# Patient Record
Sex: MALE | Race: WHITE | NOT HISPANIC OR LATINO
[De-identification: names, ages, dates, MRNs, and addresses within clinical notes are randomized per-mention and may not be internally consistent; named-entity substitution may affect disease eponyms.]

---

## 2022-03-03 ENCOUNTER — APPOINTMENT (OUTPATIENT)
Dept: INTERNAL MEDICINE | Facility: CLINIC | Age: 47
End: 2022-03-03
Payer: COMMERCIAL

## 2022-03-03 VITALS
OXYGEN SATURATION: 97 % | DIASTOLIC BLOOD PRESSURE: 79 MMHG | HEIGHT: 72 IN | TEMPERATURE: 98 F | HEART RATE: 75 BPM | WEIGHT: 214.5 LBS | SYSTOLIC BLOOD PRESSURE: 119 MMHG | BODY MASS INDEX: 29.05 KG/M2

## 2022-03-03 DIAGNOSIS — Z80.0 FAMILY HISTORY OF MALIGNANT NEOPLASM OF DIGESTIVE ORGANS: ICD-10-CM

## 2022-03-03 DIAGNOSIS — Z78.9 OTHER SPECIFIED HEALTH STATUS: ICD-10-CM

## 2022-03-03 DIAGNOSIS — Z87.898 PERSONAL HISTORY OF OTHER SPECIFIED CONDITIONS: ICD-10-CM

## 2022-03-03 PROCEDURE — 36415 COLL VENOUS BLD VENIPUNCTURE: CPT

## 2022-03-03 PROCEDURE — 99386 PREV VISIT NEW AGE 40-64: CPT | Mod: 25

## 2022-03-03 RX ORDER — PANTOPRAZOLE SODIUM 20 MG/1
20 TABLET, DELAYED RELEASE ORAL
Refills: 0 | Status: ACTIVE | COMMUNITY

## 2022-03-03 NOTE — PLAN
[FreeTextEntry1] : Routine blood work and urine today. Refer to ENT. Pt advised to sign up for [facility] portal to review labs and communicate any questions or concerns directly. Yearly physical and return as needed for illness, medication refills, and new or existing complaints.

## 2022-03-03 NOTE — REVIEW OF SYSTEMS
[Earache] : no earache [Hearing Loss] : no hearing loss [Nosebleeds] : no nosebleeds [Postnasal Drip] : postnasal drip [Nasal Discharge] : nasal discharge [Sore Throat] : no sore throat [Hoarseness] : no hoarseness [Shortness Of Breath] : no shortness of breath [Wheezing] : wheezing [Cough] : no cough [Dyspnea on Exertion] : not dyspnea on exertion [Negative] : Heme/Lymph [FreeTextEntry4] : congestion

## 2022-03-03 NOTE — HISTORY OF PRESENT ILLNESS
[de-identified] : 46 year M with PMH GERD presents for initial CPE. Pt denies CP/SOB, fever/chills, n/v/d/c.

## 2022-03-03 NOTE — HEALTH RISK ASSESSMENT
[Good] : ~his/her~  mood as  good [Never] : Never [No] : In the past 12 months have you used drugs other than those required for medical reasons? No [0] : 2) Feeling down, depressed, or hopeless: Not at all (0) [PHQ-2 Negative - No further assessment needed] : PHQ-2 Negative - No further assessment needed [None] : None [Audit-CScore] : 0 [[id number]] : 0 [Hepatitis C test offered] : Hepatitis C test offered [With Family] : lives with family [Employed] : employed [] :  [# Of Children ___] : has [unfilled] children

## 2022-03-04 LAB
ALBUMIN SERPL ELPH-MCNC: 4.5 G/DL
ALP BLD-CCNC: 67 U/L
ALT SERPL-CCNC: 15 U/L
ANION GAP SERPL CALC-SCNC: 14 MMOL/L
APPEARANCE: CLEAR
AST SERPL-CCNC: 17 U/L
BACTERIA: NEGATIVE
BASOPHILS # BLD AUTO: 0.06 K/UL
BASOPHILS NFR BLD AUTO: 1.2 %
BILIRUB SERPL-MCNC: 0.6 MG/DL
BILIRUBIN URINE: NEGATIVE
BLOOD URINE: NEGATIVE
BUN SERPL-MCNC: 16 MG/DL
CALCIUM SERPL-MCNC: 9.7 MG/DL
CHLORIDE SERPL-SCNC: 106 MMOL/L
CHOLEST SERPL-MCNC: 161 MG/DL
CO2 SERPL-SCNC: 24 MMOL/L
COLOR: YELLOW
CREAT SERPL-MCNC: 0.99 MG/DL
EGFR: 95 ML/MIN/1.73M2
EOSINOPHIL # BLD AUTO: 0.34 K/UL
EOSINOPHIL NFR BLD AUTO: 7 %
ESTIMATED AVERAGE GLUCOSE: 108 MG/DL
GLUCOSE QUALITATIVE U: NEGATIVE
GLUCOSE SERPL-MCNC: 99 MG/DL
HBA1C MFR BLD HPLC: 5.4 %
HCT VFR BLD CALC: 48.6 %
HDLC SERPL-MCNC: 60 MG/DL
HGB BLD-MCNC: 15.9 G/DL
HYALINE CASTS: 3 /LPF
IMM GRANULOCYTES NFR BLD AUTO: 0.2 %
KETONES URINE: NEGATIVE
LDLC SERPL CALC-MCNC: 83 MG/DL
LEUKOCYTE ESTERASE URINE: NEGATIVE
LYMPHOCYTES # BLD AUTO: 1.55 K/UL
LYMPHOCYTES NFR BLD AUTO: 32 %
MAN DIFF?: NORMAL
MCHC RBC-ENTMCNC: 30.7 PG
MCHC RBC-ENTMCNC: 32.7 GM/DL
MCV RBC AUTO: 93.8 FL
MICROSCOPIC-UA: NORMAL
MONOCYTES # BLD AUTO: 0.51 K/UL
MONOCYTES NFR BLD AUTO: 10.5 %
NEUTROPHILS # BLD AUTO: 2.37 K/UL
NEUTROPHILS NFR BLD AUTO: 49.1 %
NITRITE URINE: NEGATIVE
NONHDLC SERPL-MCNC: 101 MG/DL
PH URINE: 6
PLATELET # BLD AUTO: 220 K/UL
POTASSIUM SERPL-SCNC: 4.2 MMOL/L
PROT SERPL-MCNC: 6.8 G/DL
PROTEIN URINE: NORMAL
PSA SERPL-MCNC: 0.77 NG/ML
RBC # BLD: 5.18 M/UL
RBC # FLD: 12.2 %
RED BLOOD CELLS URINE: 0 /HPF
SODIUM SERPL-SCNC: 143 MMOL/L
SPECIFIC GRAVITY URINE: 1.02
SQUAMOUS EPITHELIAL CELLS: 1 /HPF
TRIGL SERPL-MCNC: 89 MG/DL
TSH SERPL-ACNC: 1.66 UIU/ML
UROBILINOGEN URINE: NORMAL
WBC # FLD AUTO: 4.84 K/UL
WHITE BLOOD CELLS URINE: 0 /HPF

## 2022-03-07 LAB
HCV AB SER QL: NONREACTIVE
HCV S/CO RATIO: 0.11 S/CO

## 2022-09-22 ENCOUNTER — NON-APPOINTMENT (OUTPATIENT)
Age: 47
End: 2022-09-22

## 2022-09-22 ENCOUNTER — APPOINTMENT (OUTPATIENT)
Dept: CARDIOLOGY | Facility: CLINIC | Age: 47
End: 2022-09-22

## 2022-09-22 VITALS
SYSTOLIC BLOOD PRESSURE: 113 MMHG | BODY MASS INDEX: 30.07 KG/M2 | TEMPERATURE: 97.9 F | OXYGEN SATURATION: 97 % | HEIGHT: 72 IN | HEART RATE: 81 BPM | DIASTOLIC BLOOD PRESSURE: 78 MMHG | WEIGHT: 222 LBS

## 2022-09-22 PROCEDURE — 99204 OFFICE O/P NEW MOD 45 MIN: CPT | Mod: 25

## 2022-09-22 PROCEDURE — 93000 ELECTROCARDIOGRAM COMPLETE: CPT

## 2022-09-29 ENCOUNTER — APPOINTMENT (OUTPATIENT)
Dept: CARDIOLOGY | Facility: CLINIC | Age: 47
End: 2022-09-29

## 2022-09-29 PROCEDURE — 93306 TTE W/DOPPLER COMPLETE: CPT

## 2022-10-20 NOTE — HISTORY OF PRESENT ILLNESS
[FreeTextEntry1] : 47M presents to establish care\par Sent in by PMD: Dr Hi Cohn \par \par pt endorsing CP, mostly at night, when he lays flat. states it hasn’t occurred in about a week. not on exertion. pt states he does get a little sob on exertion, states it is out of proportion to the amount of exertion he is doing. pt endorses some chest tightness. can occur around once a week. no assoc diaphoresis, dizziness, palpitations, syncope. \par pt attributing symptoms to increase stress.\par pt states if he experiences the symptoms he tries to relax and breath and then the symptoms resolve. \par Pt denies LE edema, but does endorse some orthopnea. pt states his wife told him that he snores. \par \par \par Prev cardiac history: none\par \par Exercise: lifting weights, walking\par Diet: none\par \par Previous cardiac testing:\par Recent labs: 3/22: hgb 15.9 tsh 1.66 a1c 5.4 Cr .99 gfr 95 tot chol 161 tg 89 hdl 60 ldl 83\par \par \par EKG: SR, non specific st changes v2\par \par \par Med hx: GERD\par Sx hx: none\par Family hx: no known cardiac hx\par Social hx:  lives in [place] with wife and 2 kids.  owns a [employer] company.  no tob/etoh/drugs\par Meds: protonix\par Allergies: nkda\par

## 2022-10-20 NOTE — REVIEW OF SYSTEMS
[Feeling Fatigued] : feeling fatigued [Chest Discomfort] : chest discomfort [Wheezing] : wheezing [Fever] : no fever [Headache] : no headache [Weight Gain (___ Lbs)] : no recent weight gain [Chills] : no chills [Weight Loss (___ Lbs)] : no recent weight loss [Blurry Vision] : no blurred vision [Sore Throat] : no sore throat [SOB] : no shortness of breath [Dyspnea on exertion] : not dyspnea during exertion [Lower Ext Edema] : no extremity edema [Palpitations] : no palpitations [Orthopnea] : no orthopnea [Syncope] : no syncope [Cough] : no cough [Nausea] : no nausea [Vomiting] : no vomiting [Dizziness] : no dizziness [Confusion] : no confusion was observed [Easy Bleeding] : no tendency for easy bleeding [Easy Bruising] : no tendency for easy bruising

## 2022-10-20 NOTE — DISCUSSION/SUMMARY
[EKG obtained to assist in diagnosis and management of assessed problem(s)] : EKG obtained to assist in diagnosis and management of assessed problem(s) [FreeTextEntry1] : 47M presents to establish CV care\par \par 1. CP\par -unclear etiology\par -pt without many risk factors for CAD, unclear if symptoms worse with exertion\par -baseline ekg with some non specific st changes anteriorly\par -will check calcium score for further eval\par -pt does endorse a positional component to symptoms, will check tte to eval for structural heart dz, pericardial disease\par \par f/u after testing\par \par >45 spent on complete encounter\par \par \par ******ADDENDUM******\par 10/20/22\par pt planning on endoscopy colonoscopy\par pt able to ambulate without cp/sob\par recent calcium score of zero\par recent echo with no severe valvular lesions, as well as preserved lv systolic function\par no hx of syncope\par pt is low risk for low risk endoscopy and is optimized from a CV perspective and may proceed without further cardiac testing\par f/u after endoscopy

## 2022-10-31 ENCOUNTER — APPOINTMENT (OUTPATIENT)
Dept: OTOLARYNGOLOGY | Facility: CLINIC | Age: 47
End: 2022-10-31

## 2022-10-31 VITALS
DIASTOLIC BLOOD PRESSURE: 81 MMHG | WEIGHT: 222 LBS | HEIGHT: 73 IN | HEART RATE: 73 BPM | BODY MASS INDEX: 29.42 KG/M2 | SYSTOLIC BLOOD PRESSURE: 121 MMHG

## 2022-10-31 DIAGNOSIS — Z83.3 FAMILY HISTORY OF DIABETES MELLITUS: ICD-10-CM

## 2022-10-31 DIAGNOSIS — J98.9 RESPIRATORY DISORDER, UNSPECIFIED: ICD-10-CM

## 2022-10-31 PROCEDURE — 31231 NASAL ENDOSCOPY DX: CPT

## 2022-10-31 PROCEDURE — 99204 OFFICE O/P NEW MOD 45 MIN: CPT | Mod: 25

## 2022-10-31 NOTE — ASSESSMENT
[FreeTextEntry1] : Juventino Olvera presents for evaluation of chronic sinonasal issues. He notes history of chronic sinusitis symptoms of nasal congestion, postnasal drainage, and hyposmia. He had recent acute exacerbation and was treated with augmentin and flonase. He continues to be symptomatic. He has history of chronic rhinitis but has not been allergy tested. He notes no issues with NSAIDs. Sinonasal endoscopy shows diffuse bilateral polyposis. Will start medical therapy below and obtain CT sinus as patient has already been on antibiotics and topical nasal regimen. Will also refer to allergy.\par \par - Doxycycline x 7 days. Side effects of doxycycline use were discussed and include, but are not limited to diarrhea, rash, skin photosensitivity, skin hyperpigmentation, esophagitis, gastrointestinal issues, tooth discoloration, and hepatotoxicity.\par - Medrol dose pack. The potential side effects of high-dose steroid use were discussed at length. These risks include but are not limited to: increased appetite, insomnia, fluid retention, mood swings, weight gain, change in blood pressure, high blood glucose, possible adrenal suppression, osteoporosis, avascular necrosis of the hip, menstrual irregularities (if applicable), and cataracts\par - Budesonide rinses BID\par - CT sinus\par - Allergy referral\par - Follow up after CT\par

## 2022-10-31 NOTE — REVIEW OF SYSTEMS
[Sneezing] : sneezing [Post Nasal Drip] : post nasal drip [Ear Pain] : ear pain [Ear Itch] : ear itch [Dizziness] : dizziness [Vertigo] : vertigo [Lightheadedness] : lightheadedness [Nasal Congestion] : nasal congestion [Nose Bleeds] : nose bleeds [Recurrent Sinus Infections] : recurrent sinus infections [Problem Snoring] : problem snoring [Sinus Pain] : sinus pain [Sinus Pressure] : sinus pressure [Sense Of Smell Problem] : sense of smell problem [Discolored Nasal Discharge] : discolored nasal discharge [Hoarseness] : hoarseness [Throat Clearing] : throat clearing [Throat Pain] : throat pain [Throat Dryness] : throat dryness [Throat Itching] : throat itching [Swelling Neck] : swelling neck [Swelling Face] : face swelling [Eye Pain] : eye pain [Eyes Itch] : itching of the eyes [Chest Pain] : chest pain [Shortness Of Breath] : shortness of breath [Cough] : cough [Heartburn] : heartburn [Negative] : Heme/Lymph [FreeTextEntry3] : watery [FreeTextEntry6] : noisy breathing [FreeTextEntry7] : difficulty  swallowing, reflux [FreeTextEntry9] : joint aches, muscle aches [de-identified] : headache [FreeTextEntry1] : chills, fatigue, daytime sleepiness, feel cooler than others

## 2022-10-31 NOTE — PHYSICAL EXAM
[Nasal Endoscopy Performed] : nasal endoscopy was performed, see procedure section for findings [Midline] : trachea located in midline position [Normal] : no rashes [de-identified] : Polyps in bilateral nasal cavities.

## 2022-10-31 NOTE — CONSULT LETTER
[Dear  ___] : Dear  [unfilled], [Consult Letter:] : I had the pleasure of evaluating your patient, [unfilled]. [Please see my note below.] : Please see my note below. [Consult Closing:] : Thank you very much for allowing me to participate in the care of this patient.  If you have any questions, please do not hesitate to contact me. [Sincerely,] : Sincerely, [FreeTextEntry3] : Richard Barth M.D.

## 2022-10-31 NOTE — HISTORY OF PRESENT ILLNESS
[de-identified] : Sam Olvera is a 46 yo male who was referred by Dr. Cohn for evaluation of sinonasal issues. He states at the beginning of Oct 2022 he developed an ear infection along with nasal congestion, rhinorrhea, postnasal drainage, cough, sinus pressure. He was prescribed augmentin and was using flonase. He notes improvement in otologic symptoms but his sinusitis symptoms have persisted. He denies fevers, chills, vision changes, or pain/restriction of extraocular movements. He denies history of recurrent sinus infections. He is unsure of history of allergies and has not been tested.   He states that there is a ball in the right nasal cavity concerning for polyp. He notes chronic symptoms of nasal congestion and postnasal drainage. He states that he has chronic hyposmia.

## 2022-11-11 ENCOUNTER — APPOINTMENT (OUTPATIENT)
Dept: OTOLARYNGOLOGY | Facility: CLINIC | Age: 47
End: 2022-11-11

## 2022-11-11 VITALS
HEIGHT: 73 IN | DIASTOLIC BLOOD PRESSURE: 80 MMHG | WEIGHT: 220 LBS | BODY MASS INDEX: 29.16 KG/M2 | HEART RATE: 76 BPM | SYSTOLIC BLOOD PRESSURE: 114 MMHG

## 2022-11-11 DIAGNOSIS — J34.2 DEVIATED NASAL SEPTUM: ICD-10-CM

## 2022-11-11 PROCEDURE — 99214 OFFICE O/P EST MOD 30 MIN: CPT

## 2022-11-11 NOTE — ASSESSMENT
[FreeTextEntry1] : Sam Olvera presents for follow-up of chronic sinusitis with nasal polyposis. He has completed medical therapy with antibiotics, oral steroids, and budesonide rinses. He saw allergy for his chronic rhinitis and notes positive allergy testing. On sinonasal endoscopy, his polyps are present bilaterally but have decreased in size. He has septal deviation bilaterally but primarily to the left. CT sinus shows wavy septal deviation and bilateral pansinus disease with bilateral polyps. Given these findings, we discussed that he is a candidate for septoplasty, outfracture of bilateral inferior turbinates, and bilateral endoscopic sinus surgeyr. We will resect his middle turbinates as he has bilateral ron bullosa.\par \par Risk of septoplasty were discussed with the patient including but not limited to bleeding, infection, septal perforation, numbness of the front two teeth, and loss of sense of smell. Unique concerns related to septal perforation were discussed including mucus crusting, pain, whistling with breathing, and bleeding. Challenges of management of septal perforation were reiterated. The patient understands these risks and wishes to proceed as discussed.\par \par The sinus surgery itself was discussed at length. Potential benefits of the procedure were discussed, though no guarantee was made nor implied. Alternatives of doing nothing versus ongoing medical therapy alone were also discussed. The risks of the procedure were individually discussed in detail as including, but not limited to bleeding, infection, loss of sense of smell, double vision, blindness, CSF leak requiring other procedures to repair, numbness of teeth/and or face, need for revision surgery, brain/skull base injury, anesthetic risks and unexpected risks. The patient understands these risks and will proceed with surgery as they wish. All questions were answered.\par \par We will schedule surgery at his earliest convenience.\par Continue budesonide rinses BID.\par Will need preop steroids leading up to surgery.\par Follow up 1 week postop.

## 2022-11-11 NOTE — PHYSICAL EXAM
[Nasal Endoscopy Performed] : nasal endoscopy was performed, see procedure section for findings [] : septum deviated to the left [Midline] : trachea located in midline position [Normal] : no rashes [de-identified] : Polyps in bilateral nasal cavities.

## 2022-11-11 NOTE — HISTORY OF PRESENT ILLNESS
[de-identified] : Sam Olvera is a 48 yo male who was referred by Dr. Cohn for evaluation of sinonasal issues. He states at the beginning of Oct 2022 he developed an ear infection along with nasal congestion, rhinorrhea, postnasal drainage, cough, sinus pressure. He was prescribed augmentin and was using flonase. He notes improvement in otologic symptoms but his sinusitis symptoms have persisted. He denies fevers, chills, vision changes, or pain/restriction of extraocular movements. He denies history of recurrent sinus infections. He is unsure of history of allergies and has not been tested.   He states that there is a ball in the right nasal cavity concerning for polyp. He notes chronic symptoms of nasal congestion and postnasal drainage. He states that he has chronic hyposmia. [FreeTextEntry1] : 11/11/22 - Sam Olvera presents for follow-up. He completed antibiotics, oral steroids, and steroid rinses. He notes improvement in nasal congestion. He denies sinus pressure, rhinorrhea, or postnasal drainage. HE denies fevers, chills, or vision changes. he saw the allergist and per the patient's report, allergy testing was positive for dust mites, mold, and weeds.

## 2022-11-11 NOTE — DATA REVIEWED
[de-identified] : CT sinus 11/4/22:\par FINDINGS:\par \par MAXILLARY SINUSES: There is mucosal thickening maxillary sinuses bilaterally.\par There is opacification infundibular channels and middle meatus bilaterally.\par There are partially opacified ron bullosa bilaterally. There is retention\par cyst in left maxillary sinus.\par \par FRONTAL SINUSES: There is mucosal thickening in right frontal sinus and\par opacification right frontal recess. There is retention cyst in left frontal\par sinus.\par \par ETHMOID COMPLEX: There is mucosal disease in anterior posterior ethmoid\par complexes bilaterally.\par \par SPHENOID SINUSES:There is mucosal thickening in sphenoid sinuses with\par opacification of sphenoid ostia bilaterally.\par \par OTHER FINDINGS: There is wavy nasal septal deviation. The septum contacts the\par left inferior turbinate. There are bilateral nasal polyps. The sino-cranial and\par sino-orbital junctions are intact. The visualized portions of the intracranial\par and intraorbital compartments demonstrate no abnormality. The mastoid air cells\par are clear bilaterally.\par \par IMPRESSION:\par \par \par Bilateral nasal polyps and inflammatory mucosal change throughout the paranasal\par sinuses.

## 2022-11-28 ENCOUNTER — NON-APPOINTMENT (OUTPATIENT)
Age: 47
End: 2022-11-28

## 2022-11-28 ENCOUNTER — APPOINTMENT (OUTPATIENT)
Dept: CARDIOLOGY | Facility: CLINIC | Age: 47
End: 2022-11-28

## 2022-11-28 VITALS
HEART RATE: 68 BPM | SYSTOLIC BLOOD PRESSURE: 126 MMHG | TEMPERATURE: 97.9 F | WEIGHT: 220 LBS | DIASTOLIC BLOOD PRESSURE: 80 MMHG | OXYGEN SATURATION: 98 % | BODY MASS INDEX: 29.16 KG/M2 | HEIGHT: 73 IN

## 2022-11-28 DIAGNOSIS — R07.89 OTHER CHEST PAIN: ICD-10-CM

## 2022-11-28 DIAGNOSIS — R94.31 ABNORMAL ELECTROCARDIOGRAM [ECG] [EKG]: ICD-10-CM

## 2022-11-28 PROCEDURE — 99215 OFFICE O/P EST HI 40 MIN: CPT | Mod: 25

## 2022-11-28 PROCEDURE — 93000 ELECTROCARDIOGRAM COMPLETE: CPT

## 2022-11-28 NOTE — REVIEW OF SYSTEMS
[Fever] : no fever [Headache] : no headache [Weight Gain (___ Lbs)] : no recent weight gain [Chills] : no chills [Feeling Fatigued] : not feeling fatigued [Weight Loss (___ Lbs)] : no recent weight loss [Blurry Vision] : no blurred vision [Sore Throat] : no sore throat [SOB] : no shortness of breath [Dyspnea on exertion] : not dyspnea during exertion [Chest Discomfort] : chest discomfort [Lower Ext Edema] : no extremity edema [Palpitations] : no palpitations [Orthopnea] : no orthopnea [Syncope] : no syncope [Cough] : no cough [Wheezing] : no wheezing [Nausea] : no nausea [Vomiting] : no vomiting [Dizziness] : no dizziness [Confusion] : no confusion was observed [Easy Bleeding] : no tendency for easy bleeding [Easy Bruising] : no tendency for easy bruising

## 2022-11-28 NOTE — HISTORY OF PRESENT ILLNESS
[FreeTextEntry1] : 47M presents for f/u.\par PMD: Dr Hi Cohn \par \par \par Previously,\par pt last seen 9/2022 with CP for pre-operative eval for a colonoscopy. symptoms were atypical, not assoc with exertion. pt attributed symptoms to stress. pt sent for calcium score which was zero. tte grossly unremarkable.\par \par pt now presents for follow up.\par today,\par \par pt endorsing CP, pt was a walk-in. \par \par Pt states symptoms are worse at night when he lays flat. symptoms not assoc with exertion. pt on protonix, several times a week/PRN. \par denies sob, diaphoresis, syncope. \par \par \par \par Prev cardiac history: none\par \par Exercise: lifting weights, walking\par Diet: none\par \par Previous cardiac testing:\par Recent labs: 3/22: hgb 15.9 tsh 1.66 a1c 5.4 Cr .99 gfr 95 tot chol 161 tg 89 hdl 60 ldl 83\par \par \par EKG: SR, non specific st changes v2 (unchanged from prev)\par \par \par Med hx: GERD\par Sx hx: none\par Family hx: no known cardiac hx\par Social hx: lives in [place] with wife and 2 kids. 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

## 2022-11-28 NOTE — DISCUSSION/SUMMARY
[FreeTextEntry1] : 47M presents for f/u\par \par 1. CP\par -unclear etiology\par -pt without many risk factors for CAD, unclear if symptoms worse with exertion\par -baseline ekg with some non specific st changes anteriorly, unchanged from prev\par -calcium score zero\par -tte grossly unremarkable\par -suspect GERD, pt on protonix prn, recommend to take daily\par -will check trop, ck now\par \par \par >40 spent on complete encounter\par  [EKG obtained to assist in diagnosis and management of assessed problem(s)] : EKG obtained to assist in diagnosis and management of assessed problem(s)

## 2022-11-29 LAB
CK MB BLD-MCNC: 2.6 NG/ML
CK SERPL-CCNC: 114 U/L

## 2022-11-30 LAB — TROPONIN-T, HIGH SENSITIVITY: 17 NG/L

## 2023-01-09 ENCOUNTER — APPOINTMENT (OUTPATIENT)
Dept: OTOLARYNGOLOGY | Facility: HOSPITAL | Age: 48
End: 2023-01-09

## 2023-01-17 ENCOUNTER — APPOINTMENT (OUTPATIENT)
Dept: OTOLARYNGOLOGY | Facility: CLINIC | Age: 48
End: 2023-01-17

## 2023-02-02 ENCOUNTER — OUTPATIENT (OUTPATIENT)
Dept: OUTPATIENT SERVICES | Facility: HOSPITAL | Age: 48
LOS: 1 days | End: 2023-02-02
Payer: COMMERCIAL

## 2023-02-02 VITALS
DIASTOLIC BLOOD PRESSURE: 84 MMHG | TEMPERATURE: 98 F | RESPIRATION RATE: 20 BRPM | HEIGHT: 72 IN | OXYGEN SATURATION: 96 % | HEART RATE: 70 BPM | WEIGHT: 220.9 LBS | SYSTOLIC BLOOD PRESSURE: 121 MMHG

## 2023-02-02 DIAGNOSIS — J34.9 UNSPECIFIED DISORDER OF NOSE AND NASAL SINUSES: ICD-10-CM

## 2023-02-02 DIAGNOSIS — J34.2 DEVIATED NASAL SEPTUM: ICD-10-CM

## 2023-02-02 DIAGNOSIS — Z01.818 ENCOUNTER FOR OTHER PREPROCEDURAL EXAMINATION: ICD-10-CM

## 2023-02-02 DIAGNOSIS — Z98.890 OTHER SPECIFIED POSTPROCEDURAL STATES: Chronic | ICD-10-CM

## 2023-02-02 DIAGNOSIS — J32.9 CHRONIC SINUSITIS, UNSPECIFIED: ICD-10-CM

## 2023-02-02 LAB
ANION GAP SERPL CALC-SCNC: 1 MMOL/L — LOW (ref 5–17)
BUN SERPL-MCNC: 16 MG/DL — SIGNIFICANT CHANGE UP (ref 7–23)
CALCIUM SERPL-MCNC: 8.8 MG/DL — SIGNIFICANT CHANGE UP (ref 8.5–10.1)
CHLORIDE SERPL-SCNC: 107 MMOL/L — SIGNIFICANT CHANGE UP (ref 96–108)
CO2 SERPL-SCNC: 32 MMOL/L — HIGH (ref 22–31)
CREAT SERPL-MCNC: 1 MG/DL — SIGNIFICANT CHANGE UP (ref 0.5–1.3)
EGFR: 93 ML/MIN/1.73M2 — SIGNIFICANT CHANGE UP
GLUCOSE SERPL-MCNC: 70 MG/DL — SIGNIFICANT CHANGE UP (ref 70–99)
HCT VFR BLD CALC: 44.3 % — SIGNIFICANT CHANGE UP (ref 39–50)
HGB BLD-MCNC: 14.8 G/DL — SIGNIFICANT CHANGE UP (ref 13–17)
MCHC RBC-ENTMCNC: 30.7 PG — SIGNIFICANT CHANGE UP (ref 27–34)
MCHC RBC-ENTMCNC: 33.4 GM/DL — SIGNIFICANT CHANGE UP (ref 32–36)
MCV RBC AUTO: 91.9 FL — SIGNIFICANT CHANGE UP (ref 80–100)
NRBC # BLD: 0 /100 WBCS — SIGNIFICANT CHANGE UP (ref 0–0)
PLATELET # BLD AUTO: 194 K/UL — SIGNIFICANT CHANGE UP (ref 150–400)
POTASSIUM SERPL-MCNC: 4 MMOL/L — SIGNIFICANT CHANGE UP (ref 3.5–5.3)
POTASSIUM SERPL-SCNC: 4 MMOL/L — SIGNIFICANT CHANGE UP (ref 3.5–5.3)
RBC # BLD: 4.82 M/UL — SIGNIFICANT CHANGE UP (ref 4.2–5.8)
RBC # FLD: 11.9 % — SIGNIFICANT CHANGE UP (ref 10.3–14.5)
SODIUM SERPL-SCNC: 140 MMOL/L — SIGNIFICANT CHANGE UP (ref 135–145)
WBC # BLD: 4.03 K/UL — SIGNIFICANT CHANGE UP (ref 3.8–10.5)
WBC # FLD AUTO: 4.03 K/UL — SIGNIFICANT CHANGE UP (ref 3.8–10.5)

## 2023-02-02 PROCEDURE — 80048 BASIC METABOLIC PNL TOTAL CA: CPT

## 2023-02-02 PROCEDURE — 36415 COLL VENOUS BLD VENIPUNCTURE: CPT

## 2023-02-02 PROCEDURE — 85027 COMPLETE CBC AUTOMATED: CPT

## 2023-02-02 PROCEDURE — G0463: CPT

## 2023-02-02 NOTE — H&P PST ADULT - NSICDXPASTSURGICALHX_GEN_ALL_CORE_FT
PAST SURGICAL HISTORY:  History of cardiac cath     History of colonoscopy     History of endoscopy

## 2023-02-02 NOTE — H&P PST ADULT - PROBLEM SELECTOR PLAN 1
Scheduled for endoscopic sinus surgery - bilateral sphenoidectomy- frontal sinus dissection- Maxillary antrostomy with removal contents- septoplasty- outfracture nasal turbinates  preop instruction provided, verbalized understanding and teach back

## 2023-02-02 NOTE — H&P PST ADULT - NSICDXPROCEDURE_GEN_ALL_CORE_FT
PROCEDURES:  Endoscopic sinus surgery 02-Feb-2023 11:36:23 chronic sinusitis/ deviated nasal septum [place]

## 2023-02-02 NOTE — H&P PST ADULT - HISTORY OF PRESENT ILLNESS
47 year old male presents for preop testing for scheduled endoscopic sinus surgery/ bilateral sphenoidectomy/ frontal sinus dissection/ maxillary antrostomy with removal contents- septoplasty- outfracture nasal turbinates on 2/13/2023. His medical history significant for GERD, mild controlled asthma, deviated septum, states had chest pain when his brother passed away many years ago (most recent ekg and echo report 11/2022 on file).  Pt denies any symptoms of covid-19 infection, scheduled for PCR test as required prior to surgery

## 2023-02-02 NOTE — H&P PST ADULT - NSICDXFAMILYHX_GEN_ALL_CORE_FT
FAMILY HISTORY:  Sibling  Still living? No  Family history of esophageal cancer, Age at diagnosis: Age Unknown  FHx: pancreatic cancer, Age at diagnosis: Age Unknown

## 2023-02-02 NOTE — H&P PST ADULT - NSICDXPASTMEDICALHX_GEN_ALL_CORE_FT
PAST MEDICAL HISTORY:  Asthma     Atypical chest pain     Deviated nasal septum     GERD (gastroesophageal reflux disease)

## 2023-02-08 PROBLEM — R07.89 OTHER CHEST PAIN: Chronic | Status: ACTIVE | Noted: 2023-02-02

## 2023-02-08 PROBLEM — K21.9 GASTRO-ESOPHAGEAL REFLUX DISEASE WITHOUT ESOPHAGITIS: Chronic | Status: ACTIVE | Noted: 2023-02-02

## 2023-02-08 PROBLEM — J34.2 DEVIATED NASAL SEPTUM: Chronic | Status: ACTIVE | Noted: 2023-02-02

## 2023-02-08 PROBLEM — J45.909 UNSPECIFIED ASTHMA, UNCOMPLICATED: Chronic | Status: ACTIVE | Noted: 2023-02-02

## 2023-02-10 ENCOUNTER — OUTPATIENT (OUTPATIENT)
Dept: OUTPATIENT SERVICES | Facility: HOSPITAL | Age: 48
LOS: 1 days | End: 2023-02-10
Payer: COMMERCIAL

## 2023-02-10 DIAGNOSIS — Z98.890 OTHER SPECIFIED POSTPROCEDURAL STATES: Chronic | ICD-10-CM

## 2023-02-10 DIAGNOSIS — Z20.828 CONTACT WITH AND (SUSPECTED) EXPOSURE TO OTHER VIRAL COMMUNICABLE DISEASES: ICD-10-CM

## 2023-02-10 LAB — SARS-COV-2 RNA SPEC QL NAA+PROBE: DETECTED

## 2023-02-10 PROCEDURE — U0003: CPT

## 2023-02-10 PROCEDURE — U0005: CPT

## 2023-02-13 ENCOUNTER — RX RENEWAL (OUTPATIENT)
Age: 48
End: 2023-02-13

## 2023-02-13 ENCOUNTER — APPOINTMENT (OUTPATIENT)
Dept: OTOLARYNGOLOGY | Facility: HOSPITAL | Age: 48
End: 2023-02-13

## 2023-02-13 RX ORDER — ALBUTEROL SULFATE 90 UG/1
108 (90 BASE) INHALANT RESPIRATORY (INHALATION)
Qty: 1 | Refills: 0 | Status: ACTIVE | COMMUNITY
Start: 2022-03-03 | End: 1900-01-01

## 2023-02-17 ENCOUNTER — APPOINTMENT (OUTPATIENT)
Dept: OTOLARYNGOLOGY | Facility: CLINIC | Age: 48
End: 2023-02-17

## 2023-03-10 NOTE — ASU PATIENT PROFILE, ADULT - SITE
Endoscopic Sinus- Bilateral Sphenoidectomy- Bilateral Frontal Sinus Dissection- Bilateral Maxillary Antrostomy W/ Removal Contents- Septoplasty

## 2023-03-12 ENCOUNTER — TRANSCRIPTION ENCOUNTER (OUTPATIENT)
Age: 48
End: 2023-03-12

## 2023-03-13 ENCOUNTER — APPOINTMENT (OUTPATIENT)
Dept: OTOLARYNGOLOGY | Facility: HOSPITAL | Age: 48
End: 2023-03-13

## 2023-03-13 ENCOUNTER — TRANSCRIPTION ENCOUNTER (OUTPATIENT)
Age: 48
End: 2023-03-13

## 2023-03-13 ENCOUNTER — OUTPATIENT (OUTPATIENT)
Dept: OUTPATIENT SERVICES | Facility: HOSPITAL | Age: 48
LOS: 1 days | End: 2023-03-13
Payer: COMMERCIAL

## 2023-03-13 VITALS
RESPIRATION RATE: 12 BRPM | SYSTOLIC BLOOD PRESSURE: 105 MMHG | HEART RATE: 90 BPM | DIASTOLIC BLOOD PRESSURE: 62 MMHG | OXYGEN SATURATION: 98 % | TEMPERATURE: 98 F

## 2023-03-13 VITALS
WEIGHT: 220.9 LBS | OXYGEN SATURATION: 97 % | SYSTOLIC BLOOD PRESSURE: 124 MMHG | DIASTOLIC BLOOD PRESSURE: 80 MMHG | TEMPERATURE: 98 F | HEIGHT: 72 IN | RESPIRATION RATE: 14 BRPM | HEART RATE: 63 BPM

## 2023-03-13 DIAGNOSIS — Z98.890 OTHER SPECIFIED POSTPROCEDURAL STATES: Chronic | ICD-10-CM

## 2023-03-13 DIAGNOSIS — J34.2 DEVIATED NASAL SEPTUM: ICD-10-CM

## 2023-03-13 DIAGNOSIS — J32.9 CHRONIC SINUSITIS, UNSPECIFIED: ICD-10-CM

## 2023-03-13 PROCEDURE — 31267 ENDOSCOPY MAXILLARY SINUS: CPT | Mod: 50

## 2023-03-13 PROCEDURE — 31259 NSL/SINS NDSC SPHN TISS RMVL: CPT | Mod: 50

## 2023-03-13 PROCEDURE — 94640 AIRWAY INHALATION TREATMENT: CPT

## 2023-03-13 PROCEDURE — 30930 THER FX NASAL INF TURBINATE: CPT

## 2023-03-13 PROCEDURE — 30520 REPAIR OF NASAL SEPTUM: CPT

## 2023-03-13 PROCEDURE — 31276 NSL/SINS NDSC FRNT TISS RMVL: CPT | Mod: 50

## 2023-03-13 PROCEDURE — 88304 TISSUE EXAM BY PATHOLOGIST: CPT | Mod: 26

## 2023-03-13 PROCEDURE — C1726: CPT

## 2023-03-13 PROCEDURE — 88304 TISSUE EXAM BY PATHOLOGIST: CPT

## 2023-03-13 PROCEDURE — 61782 SCAN PROC CRANIAL EXTRA: CPT

## 2023-03-13 PROCEDURE — C1889: CPT

## 2023-03-13 DEVICE — GWIRE SURG XPRESS LO PROF
Type: IMPLANTABLE DEVICE | Status: NON-FUNCTIONAL
Removed: 2023-03-13

## 2023-03-13 DEVICE — BLLN SINUS XPRES DIL 3/CA MUST ORDER MIN OF 3
Type: IMPLANTABLE DEVICE | Status: NON-FUNCTIONAL
Removed: 2023-03-13

## 2023-03-13 DEVICE — SURGIFLO MATRIX WITH THROMBIN KIT
Type: IMPLANTABLE DEVICE | Status: NON-FUNCTIONAL
Removed: 2023-03-13

## 2023-03-13 RX ORDER — ALBUTEROL 90 UG/1
2.5 AEROSOL, METERED ORAL ONCE
Refills: 0 | Status: COMPLETED | OUTPATIENT
Start: 2023-03-13 | End: 2023-03-13

## 2023-03-13 RX ORDER — SODIUM CHLORIDE 9 MG/ML
1000 INJECTION, SOLUTION INTRAVENOUS
Refills: 0 | Status: DISCONTINUED | OUTPATIENT
Start: 2023-03-13 | End: 2023-03-14

## 2023-03-13 RX ORDER — CEFAZOLIN SODIUM 1 G
2000 VIAL (EA) INJECTION ONCE
Refills: 0 | Status: COMPLETED | OUTPATIENT
Start: 2023-03-13 | End: 2023-03-13

## 2023-03-13 RX ORDER — ONDANSETRON 8 MG/1
4 TABLET, FILM COATED ORAL ONCE
Refills: 0 | Status: DISCONTINUED | OUTPATIENT
Start: 2023-03-13 | End: 2023-03-14

## 2023-03-13 RX ORDER — PYRIDOXINE HCL (VITAMIN B6) 100 MG
1 TABLET ORAL
Qty: 0 | Refills: 0 | DISCHARGE

## 2023-03-13 RX ORDER — PREGABALIN 225 MG/1
1 CAPSULE ORAL
Qty: 0 | Refills: 0 | DISCHARGE

## 2023-03-13 RX ORDER — PANTOPRAZOLE SODIUM 20 MG/1
1 TABLET, DELAYED RELEASE ORAL
Qty: 0 | Refills: 0 | DISCHARGE

## 2023-03-13 RX ORDER — HYDROMORPHONE HYDROCHLORIDE 2 MG/ML
0.5 INJECTION INTRAMUSCULAR; INTRAVENOUS; SUBCUTANEOUS
Refills: 0 | Status: DISCONTINUED | OUTPATIENT
Start: 2023-03-13 | End: 2023-03-14

## 2023-03-13 RX ORDER — SODIUM CHLORIDE 9 MG/ML
1000 INJECTION, SOLUTION INTRAVENOUS
Refills: 0 | Status: DISCONTINUED | OUTPATIENT
Start: 2023-03-13 | End: 2023-03-13

## 2023-03-13 RX ORDER — ALBUTEROL 90 UG/1
2 AEROSOL, METERED ORAL
Qty: 0 | Refills: 0 | DISCHARGE

## 2023-03-13 RX ADMIN — SODIUM CHLORIDE 75 MILLILITER(S): 9 INJECTION, SOLUTION INTRAVENOUS at 16:55

## 2023-03-13 RX ADMIN — SODIUM CHLORIDE 60 MILLILITER(S): 9 INJECTION, SOLUTION INTRAVENOUS at 10:46

## 2023-03-13 RX ADMIN — ALBUTEROL 2.5 MILLIGRAM(S): 90 AEROSOL, METERED ORAL at 18:07

## 2023-03-13 NOTE — ASU DISCHARGE PLAN (ADULT/PEDIATRIC) - ASU DC SPECIAL INSTRUCTIONSFT
Discharge Instructions –Endoscopic Sinus Surgery      Please Read and Follow These Instructions for the Best Outcomes Following Your Surgery!        WHAT TO EXPECT FOLLOWING SURGERY:      •	Bleeding – A drip from the nose and into the back of the throat is normal for the first two days following surgery. For flowing blood (like a faucet), contact the ENT Doctor on call as noted below.      •	Nasal congestion, fullness, facial pain, headache, and disrupted sleep are normal for the first week and are an expected part of the healing process. This will improve after the first debridement is performed in clinic about one week following surgery.       NASAL IRRIGATIONS:      •	Good post-operative irrigation on your part is essential to a successful outcome!      •	Instructions:   o	Use the Irrigation bottle/method mixed with a sterile saline solution at least three times per day (1 bottle per nostril).   o	Use distilled, bottled or boiled water tap to make up the saline solution according to the instructions.      ACTIVITY:        •	Minimize your activities with only light activity for the first week following surgery.    o	Listen to your body!  If you feel tired over the first few days, you should rest.   •	No nose blowing, stooping, straining or heavy lifting > 1 gallon of milk.    •	Sneeze with your mouth open.    •	Sleeping with your head elevated will reduce bleeding from the nose.   •	If you have been prescribed a CPAP machine, do not use until your doctor says it is safe; sleep in a recliner chair with your head elevated in the meantime.      MEDICATIONS:        •	Resume your home medications    •	Do not take any aspirin containing products or blood thinners (Goody’s, BC Powder or Harley aspirin) until told to by your surgeon   •	Do not take any intranasal medicines until told to by your surgeon   •	Pain:      o	Mild to moderate pain: Over the counter Tylenol (Acetaminophen) as per the instructions on the bottle   o	Severe pain: Take your pain medicine prescription as directed:   o	NEVER DRIVE A VEHICLE OR OPERATE HEAVY MACHINERY WHEN TAKING PRESCRIPTION PAIN MEDICINE!      DRIVING:      •	Do not drive within 24 hours of receiving anesthesia     •	Do not drive while taking prescription pain medication      DIET:      •	Resume a healthy, balanced, normal diet as you feel up to it.      FOLLOW-UP:        •	You will have important follow-up appointments starting about one week following surgery     o	You should take a prescription pain pill (with food) prior to the first follow up visit in preparation for a clinic debridement.   o	If you do not have an appointment, please call the numbers below to schedule a visit.      APPOINTMENTS:      •	[facility]	[phone]         Call our office or go to the Emergency Room immediately if you have any of the following:   o	Any vision problems/changes   o	Fever over 101.4o   o	Neck stiffness   o	Heavy or prolonged bright red bleeding from the nose

## 2023-03-13 NOTE — BRIEF OPERATIVE NOTE - NSICDXBRIEFPROCEDURE_GEN_ALL_CORE_FT
PROCEDURES:  Endoscopy, paranasal sinuses, with total ethmoidectomy, sphenoidotomy, and sphenoid sinus tissue removal 13-Mar-2023 16:13:14  Richard Barth  Endoscopy, frontal sinus 13-Mar-2023 16:13:19  Richard Barth  Maxillary antrostomy, with tissue removal 13-Mar-2023 16:13:28  Richard Barth  Septoplasty 13-Mar-2023 16:13:33  Richard Barth  Outfracture, nasal turbinate 13-Mar-2023 16:13:38  Richard Barth  FESS, using computer-assisted navigation 13-Mar-2023 16:13:44  Richard Barth

## 2023-03-13 NOTE — BRIEF OPERATIVE NOTE - OPERATION/FINDINGS
1. Septal spur to the left contacting inferior turbinate and superior deviation to right. Septoplasty performed.  2. Bilateral ron bullosa - middle turbinates resected.  3. Polyp in bilateral middle meati and sphenoethmoid recesses.  4. Purulence in bilateral maxillary sinuses.  5. Polypoid inflammation in all sinuses.

## 2023-03-20 ENCOUNTER — RX RENEWAL (OUTPATIENT)
Age: 48
End: 2023-03-20

## 2023-03-21 ENCOUNTER — APPOINTMENT (OUTPATIENT)
Dept: OTOLARYNGOLOGY | Facility: CLINIC | Age: 48
End: 2023-03-21
Payer: COMMERCIAL

## 2023-03-21 VITALS
HEIGHT: 73 IN | SYSTOLIC BLOOD PRESSURE: 128 MMHG | HEART RATE: 77 BPM | BODY MASS INDEX: 28.49 KG/M2 | DIASTOLIC BLOOD PRESSURE: 83 MMHG | WEIGHT: 215 LBS

## 2023-03-21 PROCEDURE — 99024 POSTOP FOLLOW-UP VISIT: CPT

## 2023-03-21 NOTE — PHYSICAL EXAM
[Nasal Endoscopy Performed] : nasal endoscopy was performed, see procedure section for findings [de-identified] : Ochoa splints were removed. Hemitransfixion incision healing well. [Midline] : trachea located in midline position [Normal] : no rashes

## 2023-03-21 NOTE — ASSESSMENT
[FreeTextEntry1] : Mr. Olvera presents s/p septoplasty and bilateral endoscopic sinus surgery on 3/13/23. He has done well since surgery. Ochoa splints were removed today and his septum is straight with no perforation or hematoma. Sinonasal debridement was performed and he is healign well.\par \par - Sinus rinses BID.\par - Sinus precautions for another week.\par - Complete antibiotic course.\par - Follow up in 1 week.

## 2023-03-21 NOTE — HISTORY OF PRESENT ILLNESS
[de-identified] : Sam Olvera is a 46 yo male who was referred by Dr. Cohn for evaluation of sinonasal issues. He states at the beginning of Oct 2022 he developed an ear infection along with nasal congestion, rhinorrhea, postnasal drainage, cough, sinus pressure. He was prescribed augmentin and was using flonase. He notes improvement in otologic symptoms but his sinusitis symptoms have persisted. He denies fevers, chills, vision changes, or pain/restriction of extraocular movements. He denies history of recurrent sinus infections. He is unsure of history of allergies and has not been tested.   He states that there is a ball in the right nasal cavity concerning for polyp. He notes chronic symptoms of nasal congestion and postnasal drainage. He states that he has chronic hyposmia. [FreeTextEntry1] : 11/11/22 - Sam Olvera presents for follow-up. He completed antibiotics, oral steroids, and steroid rinses. He notes improvement in nasal congestion. He denies sinus pressure, rhinorrhea, or postnasal drainage. HE denies fevers, chills, or vision changes. he saw the allergist and per the patient's report, allergy testing was positive for dust mites, mold, and weeds.\par \par 3/21/23 - Mr. Olvera presents s/p septoplasty and bilateral endoscopic sinus surgery on 3/13/23. He has done well since surgery. Pain is controlled. He denies significant bleeding. He denies vision changes or pain/restriction of extraocular movements. HE denies constant clear rhinorrhea or salty/metallic taste. He denies fevers or chills.

## 2023-03-28 ENCOUNTER — APPOINTMENT (OUTPATIENT)
Dept: OTOLARYNGOLOGY | Facility: CLINIC | Age: 48
End: 2023-03-28
Payer: COMMERCIAL

## 2023-03-28 VITALS
HEART RATE: 80 BPM | BODY MASS INDEX: 28.49 KG/M2 | DIASTOLIC BLOOD PRESSURE: 86 MMHG | SYSTOLIC BLOOD PRESSURE: 132 MMHG | WEIGHT: 215 LBS | HEIGHT: 73 IN

## 2023-03-28 PROCEDURE — 31237 NSL/SINS NDSC SURG BX POLYPC: CPT

## 2023-03-28 PROCEDURE — 99213 OFFICE O/P EST LOW 20 MIN: CPT | Mod: 25

## 2023-03-28 NOTE — ASSESSMENT
[FreeTextEntry1] : Mr. Olvera presents s/p septoplasty and bilateral endoscopic sinus surgery on 3/13/23. The septum was not addressed today. Bilateral sinonasal debridement was performed and the sinonasal cavities are healing well. He has mild polypoid inflammation on the right. Will start budesonide rinses. \par \par - Budesonide rinses BID.\par - Follow up in 2 weeks.

## 2023-03-28 NOTE — HISTORY OF PRESENT ILLNESS
[de-identified] : Sam Olvera is a 48 yo male who was referred by Dr. Cohn for evaluation of sinonasal issues. He states at the beginning of Oct 2022 he developed an ear infection along with nasal congestion, rhinorrhea, postnasal drainage, cough, sinus pressure. He was prescribed augmentin and was using flonase. He notes improvement in otologic symptoms but his sinusitis symptoms have persisted. He denies fevers, chills, vision changes, or pain/restriction of extraocular movements. He denies history of recurrent sinus infections. He is unsure of history of allergies and has not been tested.   He states that there is a ball in the right nasal cavity concerning for polyp. He notes chronic symptoms of nasal congestion and postnasal drainage. He states that he has chronic hyposmia. [FreeTextEntry1] : 11/11/22 - Sam Olvera presents for follow-up. He completed antibiotics, oral steroids, and steroid rinses. He notes improvement in nasal congestion. He denies sinus pressure, rhinorrhea, or postnasal drainage. HE denies fevers, chills, or vision changes. he saw the allergist and per the patient's report, allergy testing was positive for dust mites, mold, and weeds.\par \par 3/21/23 - Mr. Olvera presents s/p septoplasty and bilateral endoscopic sinus surgery on 3/13/23. He has done well since surgery. Pain is controlled. He denies significant bleeding. He denies vision changes or pain/restriction of extraocular movements. HE denies constant clear rhinorrhea or salty/metallic taste. He denies fevers or chills.\par \par 3/28/23 -  Mr. Olvera presents s/p septoplasty and bilateral endoscopic sinus surgery on 3/13/23. He continues to do well since surgery. He denies nasal congestion, rhinorrhea, postnasal drainage, or sinus pressure. He denies vision changes or pain/restrictoin of extraocular movmeents .He denies fevers, chills, salty/metallic taste or clear rhinorrhea.

## 2023-04-11 ENCOUNTER — APPOINTMENT (OUTPATIENT)
Dept: OTOLARYNGOLOGY | Facility: CLINIC | Age: 48
End: 2023-04-11
Payer: COMMERCIAL

## 2023-04-11 VITALS
DIASTOLIC BLOOD PRESSURE: 75 MMHG | WEIGHT: 215 LBS | BODY MASS INDEX: 28.49 KG/M2 | SYSTOLIC BLOOD PRESSURE: 118 MMHG | HEIGHT: 73 IN | HEART RATE: 99 BPM

## 2023-04-11 PROCEDURE — 99213 OFFICE O/P EST LOW 20 MIN: CPT | Mod: 24

## 2023-04-11 NOTE — HISTORY OF PRESENT ILLNESS
[de-identified] : Sam Olvera is a 46 yo male who was referred by Dr. Cohn for evaluation of sinonasal issues. He states at the beginning of Oct 2022 he developed an ear infection along with nasal congestion, rhinorrhea, postnasal drainage, cough, sinus pressure. He was prescribed augmentin and was using flonase. He notes improvement in otologic symptoms but his sinusitis symptoms have persisted. He denies fevers, chills, vision changes, or pain/restriction of extraocular movements. He denies history of recurrent sinus infections. He is unsure of history of allergies and has not been tested.   He states that there is a ball in the right nasal cavity concerning for polyp. He notes chronic symptoms of nasal congestion and postnasal drainage. He states that he has chronic hyposmia. [FreeTextEntry1] : 11/11/22 - Sam Olvera presents for follow-up. He completed antibiotics, oral steroids, and steroid rinses. He notes improvement in nasal congestion. He denies sinus pressure, rhinorrhea, or postnasal drainage. HE denies fevers, chills, or vision changes. he saw the allergist and per the patient's report, allergy testing was positive for dust mites, mold, and weeds.\par \par 3/21/23 - Mr. Olvera presents s/p septoplasty and bilateral endoscopic sinus surgery on 3/13/23. He has done well since surgery. Pain is controlled. He denies significant bleeding. He denies vision changes or pain/restriction of extraocular movements. HE denies constant clear rhinorrhea or salty/metallic taste. He denies fevers or chills.\par \par 3/28/23 -  Mr. Olvera presents s/p septoplasty and bilateral endoscopic sinus surgery on 3/13/23. He continues to do well since surgery. He denies nasal congestion, rhinorrhea, postnasal drainage, or sinus pressure. He denies vision changes or pain/restrictoin of extraocular movmeents .He denies fevers, chills, salty/metallic taste or clear rhinorrhea.\par \par 4/11/23 - Mr. Olvera presents s/p septoplasty and bilateral endoscopic sinus surgery on 3/13/23. He is on budesonide rinses. He denies nasal congestion, rhinorrhea, postnasal drainage, or sinus pressure. He denies fevers or chills. He denies vision changes, pain/restriction of extraocular movements, salty/metallic taste, or constant clear rhinorrhea.

## 2023-04-11 NOTE — ASSESSMENT
[FreeTextEntry1] : Mr. Olvera presents s/p septoplasty and bilateral endoscopic sinus surgery on 3/13/23. The septum was not addressed today. He has history of chronic rhinitis secondary to allergy and chronic sinusitis. Sinonasal endoscopy shows polypoid inflammation bilaterally. will continue steroid rinses for now.. \par \par - Budesonide rinses BID.\par - Follow up in 1 month.

## 2023-05-16 ENCOUNTER — APPOINTMENT (OUTPATIENT)
Dept: OTOLARYNGOLOGY | Facility: CLINIC | Age: 48
End: 2023-05-16
Payer: COMMERCIAL

## 2023-05-16 VITALS
BODY MASS INDEX: 28.49 KG/M2 | SYSTOLIC BLOOD PRESSURE: 125 MMHG | DIASTOLIC BLOOD PRESSURE: 87 MMHG | WEIGHT: 215 LBS | HEIGHT: 73 IN | HEART RATE: 72 BPM

## 2023-05-16 PROCEDURE — 31231 NASAL ENDOSCOPY DX: CPT | Mod: 58

## 2023-05-16 PROCEDURE — 99213 OFFICE O/P EST LOW 20 MIN: CPT | Mod: 25

## 2023-05-16 NOTE — HISTORY OF PRESENT ILLNESS
[de-identified] : Sam Olvera is a 48 yo male who was referred by Dr. Cohn for evaluation of sinonasal issues. He states at the beginning of Oct 2022 he developed an ear infection along with nasal congestion, rhinorrhea, postnasal drainage, cough, sinus pressure. He was prescribed augmentin and was using flonase. He notes improvement in otologic symptoms but his sinusitis symptoms have persisted. He denies fevers, chills, vision changes, or pain/restriction of extraocular movements. He denies history of recurrent sinus infections. He is unsure of history of allergies and has not been tested.   He states that there is a ball in the right nasal cavity concerning for polyp. He notes chronic symptoms of nasal congestion and postnasal drainage. He states that he has chronic hyposmia. [FreeTextEntry1] : 11/11/22 - Sam Olvera presents for follow-up. He completed antibiotics, oral steroids, and steroid rinses. He notes improvement in nasal congestion. He denies sinus pressure, rhinorrhea, or postnasal drainage. HE denies fevers, chills, or vision changes. he saw the allergist and per the patient's report, allergy testing was positive for dust mites, mold, and weeds.\par \par 3/21/23 - Mr. Olvera presents s/p septoplasty and bilateral endoscopic sinus surgery on 3/13/23. He has done well since surgery. Pain is controlled. He denies significant bleeding. He denies vision changes or pain/restriction of extraocular movements. HE denies constant clear rhinorrhea or salty/metallic taste. He denies fevers or chills.\par \par 3/28/23 -  Mr. Olvera presents s/p septoplasty and bilateral endoscopic sinus surgery on 3/13/23. He continues to do well since surgery. He denies nasal congestion, rhinorrhea, postnasal drainage, or sinus pressure. He denies vision changes or pain/restrictoin of extraocular movmeents .He denies fevers, chills, salty/metallic taste or clear rhinorrhea.\par \par 4/11/23 - Mr. Olvera presents s/p septoplasty and bilateral endoscopic sinus surgery on 3/13/23. He is on budesonide rinses. He denies nasal congestion, rhinorrhea, postnasal drainage, or sinus pressure. He denies fevers or chills. He denies vision changes, pain/restriction of extraocular movements, salty/metallic taste, or constant clear rhinorrhea.\par \par 5/16/23 - Mr. Olvera presents s/p septoplasty and bilateral endoscopic sinus surgery on 3/13/23. He contniues to do well. He is on steroid sinus rinses. He denies nasal congestion, rhinorrhea, postnasal drainage, sinus pressure. No fevers or chills.

## 2023-05-16 NOTE — ASSESSMENT
[FreeTextEntry1] : Mr. Olvera presents s/p septoplasty and bilateral endoscopic sinus surgery on 3/13/23. The septum was not addressed today. He has history of chronic rhinitis secondary to allergy and chronic sinusitis. His symptoms are improved. Sinonasal endoscopy shows bilateral well healed sinonasal cavities with no polyp or purulence.\par \par - Sinus rinses BID\par - Fluticasone 2 sprays BID\par - Follow up in 3 months.
all other ROS negative except as per HPI

## 2023-08-17 ENCOUNTER — APPOINTMENT (OUTPATIENT)
Dept: OTOLARYNGOLOGY | Facility: CLINIC | Age: 48
End: 2023-08-17
Payer: COMMERCIAL

## 2023-08-17 VITALS
WEIGHT: 210 LBS | HEIGHT: 73 IN | BODY MASS INDEX: 27.83 KG/M2 | DIASTOLIC BLOOD PRESSURE: 93 MMHG | SYSTOLIC BLOOD PRESSURE: 144 MMHG

## 2023-08-17 PROCEDURE — 31231 NASAL ENDOSCOPY DX: CPT

## 2023-08-17 PROCEDURE — 99213 OFFICE O/P EST LOW 20 MIN: CPT | Mod: 25

## 2023-08-17 RX ORDER — BUDESONIDE 0.5 MG/2ML
0.5 INHALANT ORAL
Qty: 2 | Refills: 3 | Status: ACTIVE | COMMUNITY
Start: 2023-03-28 | End: 1900-01-01

## 2023-08-17 NOTE — HISTORY OF PRESENT ILLNESS
[de-identified] : Sam Olvera is a 48 yo male who was referred by Dr. Cohn for evaluation of sinonasal issues. He states at the beginning of Oct 2022 he developed an ear infection along with nasal congestion, rhinorrhea, postnasal drainage, cough, sinus pressure. He was prescribed augmentin and was using flonase. He notes improvement in otologic symptoms but his sinusitis symptoms have persisted. He denies fevers, chills, vision changes, or pain/restriction of extraocular movements. He denies history of recurrent sinus infections. He is unsure of history of allergies and has not been tested.   He states that there is a ball in the right nasal cavity concerning for polyp. He notes chronic symptoms of nasal congestion and postnasal drainage. He states that he has chronic hyposmia. [FreeTextEntry1] : 11/11/22 - Sam Olvera presents for follow-up. He completed antibiotics, oral steroids, and steroid rinses. He notes improvement in nasal congestion. He denies sinus pressure, rhinorrhea, or postnasal drainage. HE denies fevers, chills, or vision changes. he saw the allergist and per the patient's report, allergy testing was positive for dust mites, mold, and weeds.  3/21/23 - Mr. Olvera presents s/p septoplasty and bilateral endoscopic sinus surgery on 3/13/23. He has done well since surgery. Pain is controlled. He denies significant bleeding. He denies vision changes or pain/restriction of extraocular movements. HE denies constant clear rhinorrhea or salty/metallic taste. He denies fevers or chills.  3/28/23 -  Mr. Olvera presents s/p septoplasty and bilateral endoscopic sinus surgery on 3/13/23. He continues to do well since surgery. He denies nasal congestion, rhinorrhea, postnasal drainage, or sinus pressure. He denies vision changes or pain/restrictoin of extraocular movmeents .He denies fevers, chills, salty/metallic taste or clear rhinorrhea.  4/11/23 - Mr. Olvera presents s/p septoplasty and bilateral endoscopic sinus surgery on 3/13/23. He is on budesonide rinses. He denies nasal congestion, rhinorrhea, postnasal drainage, or sinus pressure. He denies fevers or chills. He denies vision changes, pain/restriction of extraocular movements, salty/metallic taste, or constant clear rhinorrhea.  5/16/23 - Mr. Olvera presents s/p septoplasty and bilateral endoscopic sinus surgery on 3/13/23. He contniues to do well. He is on steroid sinus rinses. He denies nasal congestion, rhinorrhea, postnasal drainage, sinus pressure. No fevers or chills.  8/17/23 - Mr. Olvera presents for follow-up of chronic sinusitis .He is s/p septoplasty and bilatearl FESS on 3/13/23. He has been doing well. He has been intermittently usng sinus rinses and flonase. He denies nasal congestion, rhinorrhea, postnasal drainage, or sinus pressure. No fevers or chills.

## 2023-08-17 NOTE — ASSESSMENT
[FreeTextEntry1] : Mr. Olvera presents for follow-up of chronic sinusitis. He is s/p septoplasty and bilateral endoscopic sinus surgery on 3/13/23.He has history of chronic rhinitis secondary to allergy. Sinonasla endoscopy today shows bilateral recurrence of polyps, right worse than left. Will restart budesonide rinses. Will also have him see allergist.  - Start budesonide rinses BID. - Pt to see allergy - Follow up in 1 month.

## 2023-08-18 ENCOUNTER — APPOINTMENT (OUTPATIENT)
Dept: INTERNAL MEDICINE | Facility: CLINIC | Age: 48
End: 2023-08-18
Payer: COMMERCIAL

## 2023-08-18 VITALS
TEMPERATURE: 98.9 F | HEART RATE: 75 BPM | WEIGHT: 230 LBS | BODY MASS INDEX: 30.48 KG/M2 | HEIGHT: 73 IN | SYSTOLIC BLOOD PRESSURE: 134 MMHG | OXYGEN SATURATION: 96 % | DIASTOLIC BLOOD PRESSURE: 85 MMHG

## 2023-08-18 DIAGNOSIS — Z12.5 ENCOUNTER FOR SCREENING FOR MALIGNANT NEOPLASM OF PROSTATE: ICD-10-CM

## 2023-08-18 DIAGNOSIS — K21.9 GASTRO-ESOPHAGEAL REFLUX DISEASE W/OUT ESOPHAGITIS: ICD-10-CM

## 2023-08-18 DIAGNOSIS — Z00.00 ENCOUNTER FOR GENERAL ADULT MEDICAL EXAMINATION W/OUT ABNORMAL FINDINGS: ICD-10-CM

## 2023-08-18 DIAGNOSIS — Z98.890 OTHER SPECIFIED POSTPROCEDURAL STATES: ICD-10-CM

## 2023-08-18 DIAGNOSIS — E66.09 OTHER OBESITY DUE TO EXCESS CALORIES: ICD-10-CM

## 2023-08-18 DIAGNOSIS — D17.1 BENIGN LIPOMATOUS NEOPLASM OF SKIN AND SUBCUTANEOUS TISSUE OF TRUNK: ICD-10-CM

## 2023-08-18 DIAGNOSIS — Z12.11 ENCOUNTER FOR SCREENING FOR MALIGNANT NEOPLASM OF COLON: ICD-10-CM

## 2023-08-18 DIAGNOSIS — Z87.09 PERSONAL HISTORY OF OTHER DISEASES OF THE RESPIRATORY SYSTEM: ICD-10-CM

## 2023-08-18 PROCEDURE — 36415 COLL VENOUS BLD VENIPUNCTURE: CPT

## 2023-08-18 PROCEDURE — 99396 PREV VISIT EST AGE 40-64: CPT | Mod: 25

## 2023-08-18 RX ORDER — METHYLPREDNISOLONE 4 MG/1
4 TABLET ORAL
Qty: 1 | Refills: 0 | Status: DISCONTINUED | COMMUNITY
Start: 2023-02-09 | End: 2023-08-18

## 2023-08-18 RX ORDER — BUDESONIDE 0.5 MG/2ML
0.5 INHALANT ORAL
Qty: 2 | Refills: 3 | Status: DISCONTINUED | COMMUNITY
Start: 2022-10-31 | End: 2023-08-18

## 2023-08-18 RX ORDER — AMOXICILLIN AND CLAVULANATE POTASSIUM 875; 125 MG/1; MG/1
875-125 TABLET, COATED ORAL
Qty: 20 | Refills: 0 | Status: DISCONTINUED | COMMUNITY
Start: 2022-10-10 | End: 2023-08-18

## 2023-08-18 RX ORDER — PREDNISONE 10 MG/1
10 TABLET ORAL
Qty: 14 | Refills: 0 | Status: DISCONTINUED | COMMUNITY
Start: 2023-02-09 | End: 2023-08-18

## 2023-08-18 RX ORDER — DOXYCYCLINE 100 MG/1
100 CAPSULE ORAL
Qty: 14 | Refills: 0 | Status: DISCONTINUED | COMMUNITY
Start: 2022-10-31 | End: 2023-08-18

## 2023-08-18 RX ORDER — ONDANSETRON 4 MG/1
4 TABLET, ORALLY DISINTEGRATING ORAL EVERY 8 HOURS
Qty: 10 | Refills: 0 | Status: DISCONTINUED | COMMUNITY
Start: 2023-02-09 | End: 2023-08-18

## 2023-08-18 RX ORDER — DOXYCYCLINE 100 MG/1
100 CAPSULE ORAL
Qty: 28 | Refills: 0 | Status: DISCONTINUED | COMMUNITY
Start: 2023-02-09 | End: 2023-08-18

## 2023-08-18 RX ORDER — ASPIRIN 325 MG/1
TABLET, FILM COATED ORAL
Refills: 0 | Status: DISCONTINUED | COMMUNITY
End: 2023-08-18

## 2023-08-18 RX ORDER — FLUTICASONE PROPIONATE 50 UG/1
50 SPRAY, METERED NASAL TWICE DAILY
Qty: 1 | Refills: 1 | Status: DISCONTINUED | COMMUNITY
Start: 2023-05-16 | End: 2023-08-18

## 2023-08-18 RX ORDER — PREDNISONE 20 MG/1
20 TABLET ORAL DAILY
Qty: 3 | Refills: 0 | Status: DISCONTINUED | COMMUNITY
Start: 2023-03-09 | End: 2023-08-18

## 2023-08-18 RX ORDER — METHYLPREDNISOLONE 4 MG/1
4 TABLET ORAL
Qty: 1 | Refills: 0 | Status: DISCONTINUED | COMMUNITY
Start: 2022-10-31 | End: 2023-08-18

## 2023-08-18 RX ORDER — OXYMETAZOLINE HCL 0.05 %
SPRAY, NON-AEROSOL (ML) NASAL
Refills: 0 | Status: DISCONTINUED | COMMUNITY
End: 2023-08-18

## 2023-08-18 RX ORDER — OXYCODONE 5 MG/1
5 TABLET ORAL EVERY 4 HOURS
Qty: 15 | Refills: 0 | Status: DISCONTINUED | COMMUNITY
Start: 2023-02-09 | End: 2023-08-18

## 2023-08-18 NOTE — PLAN
[FreeTextEntry1] : Routine blood work drawn in office and urine collected today. Requested lipase for pancreatic CA family hx. Pt advised to sign up for [facility] portal to review labs and communicate any questions or concerns directly. Yearly physical and return as needed for illness, medication refills, and new or existing complaints.

## 2023-08-18 NOTE — HEALTH RISK ASSESSMENT
[Good] : ~his/her~  mood as  good [No] : In the past 12 months have you used drugs other than those required for medical reasons? No [0] : 2) Feeling down, depressed, or hopeless: Not at all (0) [PHQ-2 Negative - No further assessment needed] : PHQ-2 Negative - No further assessment needed [Audit-CScore] : 0 [[id number]] : 0 [Patient reported colonoscopy was normal] : Patient reported colonoscopy was normal [None] : None [With Family] : lives with family [] :  [# Of Children ___] : has [unfilled] children [ColonoscopyDate] : 01/23 [Never] : Never

## 2023-08-18 NOTE — PHYSICAL EXAM
[No Acute Distress] : no acute distress [Well-Appearing] : well-appearing [Normal Sclera/Conjunctiva] : normal sclera/conjunctiva [PERRL] : pupils equal round and reactive to light [EOMI] : extraocular movements intact [Normal Outer Ear/Nose] : the outer ears and nose were normal in appearance [Normal Oropharynx] : the oropharynx was normal [Normal TMs] : both tympanic membranes were normal [No JVD] : no jugular venous distention [No Lymphadenopathy] : no lymphadenopathy [Supple] : supple [Thyroid Normal, No Nodules] : the thyroid was normal and there were no nodules present [No Respiratory Distress] : no respiratory distress  [No Accessory Muscle Use] : no accessory muscle use [Clear to Auscultation] : lungs were clear to auscultation bilaterally [Normal Rate] : normal rate  [Regular Rhythm] : with a regular rhythm [Normal S1, S2] : normal S1 and S2 [No Murmur] : no murmur heard [No Abdominal Bruit] : a ~M bruit was not heard ~T in the abdomen [No Varicosities] : no varicosities [No Edema] : there was no peripheral edema [No Palpable Aorta] : no palpable aorta [No Extremity Clubbing/Cyanosis] : no extremity clubbing/cyanosis [Soft] : abdomen soft [Non Tender] : non-tender [Non-distended] : non-distended [No Masses] : no abdominal mass palpated [No HSM] : no HSM [Normal Bowel Sounds] : normal bowel sounds [Normal Posterior Cervical Nodes] : no posterior cervical lymphadenopathy [Normal Anterior Cervical Nodes] : no anterior cervical lymphadenopathy [No CVA Tenderness] : no CVA  tenderness [No Spinal Tenderness] : no spinal tenderness [No Joint Swelling] : no joint swelling [No Rash] : no rash [Grossly Normal Strength/Tone] : grossly normal strength/tone [Coordination Grossly Intact] : coordination grossly intact [No Focal Deficits] : no focal deficits [Normal Gait] : normal gait [Normal Affect] : the affect was normal [Normal Insight/Judgement] : insight and judgment were intact [de-identified] : lipoma

## 2023-08-18 NOTE — HISTORY OF PRESENT ILLNESS
[de-identified] : 48 year old M with PMH GERD presents for CPE. Pt denies CP/SOB, fever/chills, n/v/d/c.

## 2023-08-21 LAB
25(OH)D3 SERPL-MCNC: 42.2 NG/ML
ALBUMIN SERPL ELPH-MCNC: 4.6 G/DL
ALP BLD-CCNC: 77 U/L
ALT SERPL-CCNC: 27 U/L
ANION GAP SERPL CALC-SCNC: 10 MMOL/L
APPEARANCE: CLEAR
AST SERPL-CCNC: 19 U/L
BACTERIA: NEGATIVE /HPF
BILIRUB SERPL-MCNC: 0.5 MG/DL
BILIRUBIN URINE: NEGATIVE
BLOOD URINE: NEGATIVE
BUN SERPL-MCNC: 16 MG/DL
CALCIUM SERPL-MCNC: 10 MG/DL
CAST: 0 /LPF
CHLORIDE SERPL-SCNC: 103 MMOL/L
CHOLEST SERPL-MCNC: 185 MG/DL
CO2 SERPL-SCNC: 27 MMOL/L
COLOR: YELLOW
CREAT SERPL-MCNC: 1 MG/DL
EGFR: 92 ML/MIN/1.73M2
EPITHELIAL CELLS: 0 /HPF
ESTIMATED AVERAGE GLUCOSE: 103 MG/DL
GLUCOSE QUALITATIVE U: NEGATIVE MG/DL
GLUCOSE SERPL-MCNC: 97 MG/DL
HBA1C MFR BLD HPLC: 5.2 %
HDLC SERPL-MCNC: 66 MG/DL
KETONES URINE: NEGATIVE MG/DL
LDLC SERPL CALC-MCNC: 103 MG/DL
LEUKOCYTE ESTERASE URINE: NEGATIVE
LPL SERPL-CCNC: 43 U/L
MICROSCOPIC-UA: NORMAL
NITRITE URINE: NEGATIVE
NONHDLC SERPL-MCNC: 120 MG/DL
PH URINE: 6
POTASSIUM SERPL-SCNC: 5.3 MMOL/L
PROT SERPL-MCNC: 7 G/DL
PROTEIN URINE: NEGATIVE MG/DL
PSA FREE FLD-MCNC: 55 %
PSA FREE SERPL-MCNC: 0.4 NG/ML
PSA SERPL-MCNC: 0.73 NG/ML
RED BLOOD CELLS URINE: 0 /HPF
SODIUM SERPL-SCNC: 141 MMOL/L
SPECIFIC GRAVITY URINE: 1.02
TRIGL SERPL-MCNC: 94 MG/DL
TSH SERPL-ACNC: 1.09 UIU/ML
UROBILINOGEN URINE: 0.2 MG/DL
WHITE BLOOD CELLS URINE: 0 /HPF

## 2023-10-26 ENCOUNTER — APPOINTMENT (OUTPATIENT)
Dept: OTOLARYNGOLOGY | Facility: CLINIC | Age: 48
End: 2023-10-26
Payer: COMMERCIAL

## 2023-10-26 VITALS — BODY MASS INDEX: 29.82 KG/M2 | HEIGHT: 73 IN | WEIGHT: 225 LBS

## 2023-10-26 VITALS — DIASTOLIC BLOOD PRESSURE: 79 MMHG | SYSTOLIC BLOOD PRESSURE: 117 MMHG | HEART RATE: 77 BPM

## 2023-10-26 DIAGNOSIS — J31.0 CHRONIC RHINITIS: ICD-10-CM

## 2023-10-26 DIAGNOSIS — J32.9 CHRONIC SINUSITIS, UNSPECIFIED: ICD-10-CM

## 2023-10-26 PROCEDURE — 31231 NASAL ENDOSCOPY DX: CPT

## 2023-10-26 PROCEDURE — 99213 OFFICE O/P EST LOW 20 MIN: CPT | Mod: 25

## 2023-10-26 RX ORDER — METHYLPREDNISOLONE 4 MG/1
4 TABLET ORAL
Qty: 1 | Refills: 0 | Status: ACTIVE | COMMUNITY
Start: 2023-10-26 | End: 1900-01-01

## 2023-11-29 ENCOUNTER — APPOINTMENT (OUTPATIENT)
Dept: OTOLARYNGOLOGY | Facility: CLINIC | Age: 48
End: 2023-11-29

## 2024-08-23 NOTE — ASU PATIENT PROFILE, ADULT - VISION (WITH CORRECTIVE LENSES IF THE PATIENT USUALLY WEARS THEM):
Patient confirmed appointment with Natalie on 8/26/24.   Normal vision: sees adequately in most situations; can see medication labels, newsprint

## 2024-09-04 NOTE — ASU PATIENT PROFILE, ADULT - NSICDXPASTMEDICALHX_GEN_ALL_CORE_FT
1.71 PAST MEDICAL HISTORY:  Asthma     Atypical chest pain     Deviated nasal septum     GERD (gastroesophageal reflux disease)

## 2024-10-31 ENCOUNTER — APPOINTMENT (OUTPATIENT)
Dept: INTERNAL MEDICINE | Facility: CLINIC | Age: 49
End: 2024-10-31
Payer: COMMERCIAL

## 2024-10-31 VITALS
WEIGHT: 219 LBS | TEMPERATURE: 98.7 F | BODY MASS INDEX: 29.03 KG/M2 | HEIGHT: 73.03 IN | SYSTOLIC BLOOD PRESSURE: 118 MMHG | HEART RATE: 70 BPM | DIASTOLIC BLOOD PRESSURE: 78 MMHG | OXYGEN SATURATION: 96 %

## 2024-10-31 DIAGNOSIS — Z00.00 ENCOUNTER FOR GENERAL ADULT MEDICAL EXAMINATION W/OUT ABNORMAL FINDINGS: ICD-10-CM

## 2024-10-31 DIAGNOSIS — R07.89 OTHER CHEST PAIN: ICD-10-CM

## 2024-10-31 DIAGNOSIS — Z87.09 PERSONAL HISTORY OF OTHER DISEASES OF THE RESPIRATORY SYSTEM: ICD-10-CM

## 2024-10-31 DIAGNOSIS — E66.3 OVERWEIGHT: ICD-10-CM

## 2024-10-31 DIAGNOSIS — Z12.5 ENCOUNTER FOR SCREENING FOR MALIGNANT NEOPLASM OF PROSTATE: ICD-10-CM

## 2024-10-31 DIAGNOSIS — K21.9 GASTRO-ESOPHAGEAL REFLUX DISEASE W/OUT ESOPHAGITIS: ICD-10-CM

## 2024-10-31 DIAGNOSIS — J32.9 CHRONIC SINUSITIS, UNSPECIFIED: ICD-10-CM

## 2024-10-31 DIAGNOSIS — Z23 ENCOUNTER FOR IMMUNIZATION: ICD-10-CM

## 2024-10-31 DIAGNOSIS — Z12.11 ENCOUNTER FOR SCREENING FOR MALIGNANT NEOPLASM OF COLON: ICD-10-CM

## 2024-10-31 PROCEDURE — 36415 COLL VENOUS BLD VENIPUNCTURE: CPT

## 2024-10-31 PROCEDURE — 99396 PREV VISIT EST AGE 40-64: CPT

## 2024-10-31 RX ORDER — DUPILUMAB 300 MG/2ML
300 INJECTION, SOLUTION SUBCUTANEOUS
Refills: 0 | Status: ACTIVE | COMMUNITY

## 2024-11-01 LAB
ALBUMIN SERPL ELPH-MCNC: 4.2 G/DL
ALP BLD-CCNC: 75 U/L
ALT SERPL-CCNC: 14 U/L
ANION GAP SERPL CALC-SCNC: 13 MMOL/L
APPEARANCE: ABNORMAL
AST SERPL-CCNC: 19 U/L
BACTERIA: NEGATIVE /HPF
BASOPHILS # BLD AUTO: 0.06 K/UL
BASOPHILS NFR BLD AUTO: 1.2 %
BILIRUB SERPL-MCNC: 0.6 MG/DL
BILIRUBIN URINE: NEGATIVE
BLOOD URINE: NEGATIVE
BUN SERPL-MCNC: 13 MG/DL
CALCIUM SERPL-MCNC: 9.9 MG/DL
CAST: 0 /LPF
CHLORIDE SERPL-SCNC: 102 MMOL/L
CHOLEST SERPL-MCNC: 168 MG/DL
CO2 SERPL-SCNC: 27 MMOL/L
COLOR: YELLOW
CREAT SERPL-MCNC: 1.07 MG/DL
EGFR: 85 ML/MIN/1.73M2
EOSINOPHIL # BLD AUTO: 0.28 K/UL
EOSINOPHIL NFR BLD AUTO: 5.6 %
EPITHELIAL CELLS: 0 /HPF
ESTIMATED AVERAGE GLUCOSE: 108 MG/DL
GLUCOSE QUALITATIVE U: NEGATIVE MG/DL
GLUCOSE SERPL-MCNC: 86 MG/DL
HBA1C MFR BLD HPLC: 5.4 %
HCT VFR BLD CALC: 47.8 %
HDLC SERPL-MCNC: 54 MG/DL
HGB BLD-MCNC: 15.7 G/DL
IMM GRANULOCYTES NFR BLD AUTO: 0.2 %
KETONES URINE: NEGATIVE MG/DL
LDLC SERPL CALC-MCNC: 96 MG/DL
LEUKOCYTE ESTERASE URINE: NEGATIVE
LYMPHOCYTES # BLD AUTO: 1.35 K/UL
LYMPHOCYTES NFR BLD AUTO: 27.1 %
MAN DIFF?: NORMAL
MCHC RBC-ENTMCNC: 30.8 PG
MCHC RBC-ENTMCNC: 32.8 G/DL
MCV RBC AUTO: 93.9 FL
MICROSCOPIC-UA: NORMAL
MONOCYTES # BLD AUTO: 0.55 K/UL
MONOCYTES NFR BLD AUTO: 11 %
NEUTROPHILS # BLD AUTO: 2.74 K/UL
NEUTROPHILS NFR BLD AUTO: 54.9 %
NITRITE URINE: NEGATIVE
NONHDLC SERPL-MCNC: 113 MG/DL
PH URINE: 8
PLATELET # BLD AUTO: 202 K/UL
POTASSIUM SERPL-SCNC: 4.9 MMOL/L
PROT SERPL-MCNC: 6.9 G/DL
PROTEIN URINE: NEGATIVE MG/DL
PSA SERPL-MCNC: 0.75 NG/ML
RBC # BLD: 5.09 M/UL
RBC # FLD: 12.3 %
RED BLOOD CELLS URINE: 1 /HPF
SODIUM SERPL-SCNC: 141 MMOL/L
SPECIFIC GRAVITY URINE: 1.02
TRIGL SERPL-MCNC: 91 MG/DL
TSH SERPL-ACNC: 1.18 UIU/ML
UROBILINOGEN URINE: 0.2 MG/DL
WBC # FLD AUTO: 4.99 K/UL
WHITE BLOOD CELLS URINE: 0 /HPF

## 2025-08-22 ENCOUNTER — APPOINTMENT (OUTPATIENT)
Dept: OTOLARYNGOLOGY | Facility: CLINIC | Age: 50
End: 2025-08-22
Payer: COMMERCIAL

## 2025-08-22 ENCOUNTER — NON-APPOINTMENT (OUTPATIENT)
Age: 50
End: 2025-08-22

## 2025-08-22 VITALS
HEART RATE: 66 BPM | DIASTOLIC BLOOD PRESSURE: 86 MMHG | BODY MASS INDEX: 41.91 KG/M2 | SYSTOLIC BLOOD PRESSURE: 131 MMHG | WEIGHT: 222 LBS | HEIGHT: 61 IN

## 2025-08-22 DIAGNOSIS — J32.9 CHRONIC SINUSITIS, UNSPECIFIED: ICD-10-CM

## 2025-08-22 DIAGNOSIS — J31.0 CHRONIC RHINITIS: ICD-10-CM

## 2025-08-22 PROCEDURE — 99213 OFFICE O/P EST LOW 20 MIN: CPT | Mod: 25

## 2025-08-22 PROCEDURE — 31231 NASAL ENDOSCOPY DX: CPT

## (undated) DEVICE — PLV/PSP-ESU FORCEFX F8I7418A: Type: DURABLE MEDICAL EQUIPMENT

## (undated) DEVICE — PACK NASAL

## (undated) DEVICE — PATIENT TRACKER TABS (ADHESIVE PADS)

## (undated) DEVICE — GLV 7.5 PROTEXIS (WHITE)

## (undated) DEVICE — TUBING IV EXTENSION 30"

## (undated) DEVICE — TUBING DIEGO SUCTION IRRIGATION 12FT

## (undated) DEVICE — SOL IRR POUR H2O 1000ML

## (undated) DEVICE — DRSG SPLINT DYL II ARWY SILICONE

## (undated) DEVICE — NDL HYPO REGULAR BEVEL 25G X 1.5" (BLUE)

## (undated) DEVICE — SYR LUER LOK 20CC

## (undated) DEVICE — CATH NG SALEM SUMP 16FR

## (undated) DEVICE — WARMING BLANKET LOWER ADULT

## (undated) DEVICE — PLV-DIEGO CONSOLE: Type: DURABLE MEDICAL EQUIPMENT

## (undated) DEVICE — PLV-SCD MACHINE: Type: DURABLE MEDICAL EQUIPMENT

## (undated) DEVICE — VENODYNE/SCD SLEEVE CALF LARGE

## (undated) DEVICE — SHAVER BLADE GYRUS DIEGO 4MM STRAIGHT

## (undated) DEVICE — Device

## (undated) DEVICE — TUBING SUCTION 20FT

## (undated) DEVICE — STRYKER INSTRUMENT CLAMP SPHERE

## (undated) DEVICE — SYR LUER LOK 10CC

## (undated) DEVICE — SPECIMEN CONTAINER 4OZ

## (undated) DEVICE — SUT CHROMIC 4-0 27" RB-1

## (undated) DEVICE — DRAPE TOWEL BLUE 17" X 24"

## (undated) DEVICE — SOL INJ NS 0.9% 1000ML

## (undated) DEVICE — SUCTION YANKAUER TAPERED BULBOUS NO VENT

## (undated) DEVICE — SUT MONOSOF 3-0 18" P-12

## (undated) DEVICE — DRAPE INSTRUMENT POUCH 6.75" X 11"

## (undated) DEVICE — ELCTR BOVIE SUCTION 8FR 6"

## (undated) DEVICE — SOL IRR POUR NS 0.9% 1000ML

## (undated) DEVICE — SUT SURGIPRO II 4-0 18" P-12

## (undated) DEVICE — PROTECTOR HEEL / ELBOW FLUFFY

## (undated) DEVICE — FRAZIER MALLEABLE SUCTION (STR) 3MM

## (undated) DEVICE — EICKEN MALLEABLE SUCTION (CVD) 3MM

## (undated) DEVICE — SUT PLAIN GUT 4-0 18" SC-1

## (undated) DEVICE — BLADE SCALPEL SAFETYLOCK #15

## (undated) DEVICE — DRSG TELFA 3 X 4

## (undated) DEVICE — SOL ANTI FOG

## (undated) DEVICE — SHAVER BLADE GYRUS DIEGO 2MM STRAIGHT

## (undated) DEVICE — STRYKER PRECISION POINTER ELECTROMAGNETIC 1.5MM

## (undated) DEVICE — VENODYNE/SCD SLEEVE CALF MEDIUM